# Patient Record
Sex: MALE | Race: BLACK OR AFRICAN AMERICAN
[De-identification: names, ages, dates, MRNs, and addresses within clinical notes are randomized per-mention and may not be internally consistent; named-entity substitution may affect disease eponyms.]

---

## 2023-10-23 ENCOUNTER — HOSPITAL ENCOUNTER (EMERGENCY)
Dept: HOSPITAL 97 - ER | Age: 28
Discharge: HOME | End: 2023-10-23
Payer: SELF-PAY

## 2023-10-23 DIAGNOSIS — R51.9: ICD-10-CM

## 2023-10-23 DIAGNOSIS — Z72.0: ICD-10-CM

## 2023-10-23 DIAGNOSIS — J02.9: ICD-10-CM

## 2023-10-23 DIAGNOSIS — R05.9: Primary | ICD-10-CM

## 2023-10-23 DIAGNOSIS — Z20.822: ICD-10-CM

## 2023-10-23 DIAGNOSIS — J06.9: ICD-10-CM

## 2023-10-23 PROCEDURE — 87635 SARS-COV-2 COVID-19 AMP PRB: CPT

## 2023-10-23 PROCEDURE — 87081 CULTURE SCREEN ONLY: CPT

## 2023-10-23 PROCEDURE — 87804 INFLUENZA ASSAY W/OPTIC: CPT

## 2023-10-23 PROCEDURE — 71045 X-RAY EXAM CHEST 1 VIEW: CPT

## 2023-10-23 NOTE — ER
Nurse's Notes                                                                                     

 [facility]                                                                 

Name: Elaine Levi                                                                             

Age: 28 yrs                                                                                       

Sex: Male                                                                                         

: 1995                                                                                   

MRN: [id number]                                                                                   

Arrival Date: 10/23/2023                                                                          

Time: 16:20                                                                                       

Account#: [id number]                                                                            

Bed 12                                                                                            

Private MD:                                                                                       

Diagnosis: Acute upper respiratory infection, unspecified                                         

                                                                                                  

Presentation:                                                                                     

10/23                                                                                             

16:37 Chief complaint: Patient states: sore throat and headache onset yesterday. Coronavirus  cm10

      screen: Vaccine status: Patient reports receiving the 2nd dose of the covid vaccine.        

      Client denies travel out of the [place] in the last 14 days. Ebola Screen: Patient denies      

      travel to an Ebola-affected area in the 21 days before illness onset. No symptoms or        

      risks identified at this time. Initial Sepsis Screen: Does the patient meet any 2           

      criteria? No. Patient's initial sepsis screen is negative. Does the patient have a          

      suspected source of infection? No. Patient's initial sepsis screen is negative. Risk        

      Assessment: Do you want to hurt yourself or someone else? Patient reports no desire to      

      harm self or others. Onset of symptoms was 2023.                                

16:37 Method Of Arrival: Ambulatory                                                           cm10

16:37 Acuity: CLAUDINE 3                                                                           cm10

                                                                                                  

Triage Assessment:                                                                                

16:38 General: Appears in no apparent distress. comfortable, Behavior is calm, cooperative.   cm10

      Pain: Complains of pain in head. EENT: No deficits noted. Reports pain in throat.           

      Neuro: No deficits noted. Mares Agitation-Sedation Scale (RASS): 0 - Alert and Calm      

      Level of Consciousness is awake, alert, obeys commands, Oriented to person, place,          

      time, situation. Cardiovascular: No deficits noted. Patient's skin is warm and dry.         

      Respiratory: No deficits noted. Airway is patent Respiratory effort is even, unlabored,     

      Respiratory pattern is regular, symmetrical. GI: No deficits noted. Reports nausea. :     

      No deficits noted. No signs and/or symptoms were reported regarding the genitourinary       

      system. Derm: No deficits noted. No signs and/or symptoms reported regarding the            

      dermatologic system. Skin is intact, Skin is pink, warm \T\ dry. Musculoskeletal: No        

      deficits noted. No signs and/or symptoms reported regarding the musculoskeletal system.     

      Range of motion: intact in all extremities.                                                 

                                                                                                  

Historical:                                                                                       

- Allergies:                                                                                      

16:38 No Known Allergies;                                                                     cm10

- Home Meds:                                                                                      

16:38 None [Active];                                                                          cm10

- PMHx:                                                                                           

16:38 None;                                                                                   cm10

- PSHx:                                                                                           

16:38 None;                                                                                   cm10

                                                                                                  

- Immunization history:: Adult Immunizations unknown.                                             

- Social history:: Smoking status: Patient reports the use of cigarette tobacco                   

  products, smokes one-half pack cigarettes per day.                                              

                                                                                                  

                                                                                                  

Screenin:39 [facility] ED Fall Risk Assessment (Adult) History of falling in the last 3 months,       cm10

      including since admission No falls in past 3 months (0 pts) Confusion or Disorientation     

      No (0 pts) Intoxicated or Sedated No (0 pts) Impaired Gait No (0 pts) Mobility Assist       

      Device Used No (0 pt) Altered Elimination No (0 pt) Score/Fall Risk Level 0 - 2 = Low       

      Risk Oriented to surroundings, Maintained a safe environment, Hourly rounding (assess       

      needs \T\ fall precautionary measures) done. Abuse screen: Denies threats or abuse.         

      Denies injuries from another. Nutritional screening: No deficits noted. Tuberculosis        

      screening: No symptoms or risk factors identified.                                          

                                                                                                  

Assessment:                                                                                       

17:02 General: Appears in no apparent distress. Behavior is calm, cooperative. Pain: Pain     hb  

      currently is 5 out of 10 on a pain scale. Neuro: Level of Consciousness is awake,           

      alert, obeys commands, Oriented to person, place, time, situation. Cardiovascular:          

      Patient's skin is warm and dry. Respiratory: Respiratory effort is even, unlabored,         

      Respiratory pattern is regular, symmetrical. GI: Reports nausea. : No signs and/or        

      symptoms were reported regarding the genitourinary system. EENT: Reports sore throat.       

      Derm: Skin is pink, warm \T\ dry. Musculoskeletal: No signs and/or symptoms reported        

      regarding the musculoskeletal system.                                                       

18:00 Reassessment: Patient appears in no apparent distress at this time. Patient and/or      hb  

      family updated on plan of care and expected duration. Pain level reassessed. Patient is     

      alert, oriented x 3, equal unlabored respirations, skin warm/dry/pink.                      

                                                                                                  

Vital Signs:                                                                                      

16:37  / 79; Pulse 94; Resp 18 S; Temp 98.7(TE); Pulse Ox 98% on R/A; Weight 102.06 kg  cm10

      (R); Height 6 ft. 2 in. (R); Pain 7/10;                                                     

16:37 Body Mass Index 28.89 (102.06 kg, 187.96 cm)                                            cm10

16:37 Pain Scale: Adult                                                                       cm10

                                                                                                  

ED Course:                                                                                        

16:24 Patient arrived in ED.                                                                  mg5 

16:25 [facility]                                                          jh7 

16:25 Roscoe Johnson MD is Attending Physician.                                                7 

16:38 Triage completed.                                                                       10

16:38 Arm band placed on Patient placed in an exam room, on a stretcher.                      cm10

16:39 Patient has correct armband on for positive identification. Bed in low position.        cm10

      Provided Education on: ER process and procedures. .                                         

16:43 Azucena Regalado, RN is Primary Nurse.                                                   hb  

17:02 No provider procedures requiring assistance completed. Patient did not have IV access   hb  

      during this emergency room visit.                                                           

17:17 XRAY Chest (1 view) In Process Unspecified.                                             EDMS

                                                                                                  

Administered Medications:                                                                         

16:34 Not Given (Patient Refused): ns 0.9% 1000 ml IV at 1 bolus Per protocol; 1000 mL bolus  [facility] 

16:34 Not Given (Patient Refused): ondansetron 4 mg IVP once; over 2 minutes                  [facility] 

16:54 Drug: Ondansetron PO 4 mg PO once Route: PO;                                            hb  

17:40 Follow up: Response: No adverse reaction                                                hb  

                                                                                                  

                                                                                                  

Medication:                                                                                       

16:39 VIS not applicable for this client.                                                     [facility]

                                                                                                  

Outcome:                                                                                          

17:55 Discharge ordered by MD.                                                                7 

18:07 Discharged to home ambulatory,                                                          hb  

18:07 Condition: stable                                                                           

18:07 Discharge instructions given to patient, Instructed on discharge instructions, follow       

      up and referral plans. medication usage, Demonstrated understanding of instructions,        

      follow-up care, medications, Prescriptions given X 2,                                       

18:07 Patient left the ED.                                                                    hb  

                                                                                                  

Signatures:                                                                                       

Dispatcher [employer]                           EDMS                                                 

Azucena Regalado, RN                     RN                                                      

Genny Liang FNP FNP  Luanne Marin RN                  RN   10                                                 

Vivienne Araujo                             mg5                                                  

                                                                                                  

**************************************************************************************************

## 2023-10-23 NOTE — RAD REPORT
EXAM DESCRIPTION:  [facility] Single View10/23/2023 5:16 pm

 

CLINICAL HISTORY:  cough

 

COMPARISON:  none

 

FINDINGS:   The lungs appear clear of acute infiltrate. The heart is normal size

 

IMPRESSION:  No acute abnormalities displayed

## 2023-10-23 NOTE — EDPHYS
Physician Documentation                                                                           

 [facility]                                                                 

Name: Elaine Levi                                                                             

Age: 28 yrs                                                                                       

Sex: Male                                                                                         

: 1995                                                                                   

MRN: [id number]                                                                                   

Arrival Date: 10/23/2023                                                                          

Time: 16:20                                                                                       

Account#: [id number]                                                                            

Bed 12                                                                                            

Private MD:                                                                                       

ED Physician Roscoe Johnson                                                                         

HPI:                                                                                              

10/23                                                                                             

16:37 This 28 yrs old Black Male presents to ER via Ambulatory with complaints of Sore        jh7 

      Throat, Doesnt Feel Good.                                                                   

16:37 The patient presents with sore throat. The patient describes throat pain as burning.    jh7 

      Onset: The symptoms/episode began/occurred last night. Modifying factors: the symptoms      

      are aggravated by swallowing. Associated signs and symptoms: Pertinent positives:           

      chills, cough, shortness of breath Sore throat.                                             

                                                                                                  

Historical:                                                                                       

- Allergies:                                                                                      

16:38 No Known Allergies;                                                                     cm10

- Home Meds:                                                                                      

16:38 None [Active];                                                                          cm10

- PMHx:                                                                                           

16:38 None;                                                                                   cm10

- PSHx:                                                                                           

16:38 None;                                                                                   cm10

                                                                                                  

- Immunization history:: Adult Immunizations unknown.                                             

- Social history:: Smoking status: Patient reports the use of cigarette tobacco                   

  products, smokes one-half pack cigarettes per day.                                              

                                                                                                  

                                                                                                  

ROS:                                                                                              

16:37 Constitutional: Negative for fever, chills, and weight loss, Eyes: Negative for injury, jh7 

      pain, redness, and discharge, Neck: Negative for injury, pain, and swelling,                

      Cardiovascular: Negative for chest pain, palpitations, and edema, Back: Negative for        

      injury and pain, MS/Extremity: Negative for injury and deformity, Skin: Negative for        

      injury, rash, and discoloration, Neuro: Negative for headache, weakness, numbness,          

      tingling, and seizure,                                                                      

16:37 Constitutional:                                                                             

16:37 ENT: Positive for sore throat,                                                              

16:37 Respiratory: Positive for cough, shortness of breath,                                       

16:37 Abdomen/GI: Positive for nausea, vomiting, Negative for abdominal pain,                     

16:37 All other systems are negative,                                                             

                                                                                                  

Exam:                                                                                             

16:37 Constitutional:  This is a well developed, well nourished patient who is awake, alert,  jh7 

      and in no acute distress. Head/Face:  Normocephalic, atraumatic. Cardiovascular:            

      Regular rate and rhythm with a normal S1 and S2.  No gallops, murmurs, or rubs.  Normal     

      PMI, no JVD.  No pulse deficits. Abdomen/GI:  Soft, non-tender, with normal bowel           

      sounds.  No distension or tympany.  No guarding or rebound.  No evidence of tenderness      

      throughout. Skin:  Warm, dry with normal turgor.  Normal color with no rashes, no           

      lesions, and no evidence of cellulitis. MS/ Extremity:  Pulses equal, no cyanosis.          

      Neurovascular intact.  Full, normal range of motion. Neuro:  Awake and alert, GCS 15,       

      oriented to person, place, time, and situation.   Normal gait.                              

16:37 ENT: Posterior pharynx: pooling of secretions, that are mild,                               

16:37 Respiratory: the patient does not display signs of respiratory distress,  Respirations:     

      normal, Breath sounds: decreased breath sounds, that are mild, are scattered, + upper       

      airway congestion.                                                                          

                                                                                                  

Vital Signs:                                                                                      

16:37  / 79; Pulse 94; Resp 18 S; Temp 98.7(TE); Pulse Ox 98% on R/A; Weight 102.06 kg  cm10

      (R); Height 6 ft. 2 in. (R); Pain 7/10;                                                     

16:37 Body Mass Index 28.89 (102.06 kg, 187.96 cm)                                            cm10

16:37 Pain Scale: Adult                                                                       cm10

                                                                                                  

MDM:                                                                                              

16:25 Patient medically screened.                                                             [facility] 

17:55 Differential diagnosis: Allergic rhinitis, bronchitis, group A strep tonsillitis,       [facility] 

      influenza, pharyngitis, upper respiratory infection, viral syndrome. Data reviewed:         

      vital signs, nurses notes, radiologic studies, plain films. I considered the following      

      discharge prescriptions or medication management in the emergency department                

      Medications were administered in the Emergency Department. See MAR. Counseling: I had a     

      detailed discussion with the patient and/or guardian regarding the historical points,       

      exam findings, and any diagnostic results supporting the discharge/admit diagnosis, to      

      return to the emergency department if symptoms worsen or persist or if there are any        

      questions or concerns that arise at home. Response to treatment: the patient's symptoms     

      have mildly improved after treatment. Special discussion: Advised taking Mucinex at         

      home..                                                                                      

                                                                                                  

10/23                                                                                             

16:30 Order name: Strep                                                                       [facility] 

10/23                                                                                             

16:41 Order name: COVID-19 SARS RT PCR; Complete Time: 17:52                                  cm10

10/23                                                                                             

16:41 Order name: Influenza Screen (a \T\ B); Complete Time: 17:33                              cm10

10/23                                                                                             

16:30 Order name: XRAY Chest (1 view); Complete Time: 17:42                                   [facility] 

                                                                                                  

Administered Medications:                                                                         

16:34 Not Given (Patient Refused): ns 0.9% 1000 ml IV at 1 bolus Per protocol; 1000 mL bolus  [facility] 

16:34 Not Given (Patient Refused): ondansetron 4 mg IVP once; over 2 minutes                  [facility] 

16:54 Drug: Ondansetron PO 4 mg PO once Route: PO;                                            hb  

17:40 Follow up: Response: No adverse reaction                                                hb  

                                                                                                  

                                                                                                  

Disposition:                                                                                      

19:04 Co-signature as Attending Physician, Roscoe Johnson MD I reviewed the patient's care       rn  

      provided by the Advanced Practice Provider and agree with the diagnosis and treatment       

      plan.                                                                                       

                                                                                                  

Disposition Summary:                                                                              

10/23/23 17:55                                                                                    

Discharge Ordered                                                                                 

 Notes:       Location: Home                                                                        
  [facility]

      Problem: new                                                                            [facility] 

      Symptoms: have improved                                                                 [facility] 

      Condition: Stable                                                                       [facility] 

      Diagnosis                                                                                   

        - Acute upper respiratory infection, unspecified                                      [facility] 

      Followup:                                                                               [facility] 

        - With: Private Physician                                                                  

        - When: 2 - 3 days                                                                         

        - Reason: Recheck today's complaints                                                       

      Discharge Instructions:                                                                     

        - Discharge Summary Sheet                                                             [facility] 

        - Upper Respiratory Infection, Adult                                                  [facility] 

        - Viral Respiratory Infection                                                         [facility] 

      Forms:                                                                                      

        - Work release form                                                                   [facility] 

        - Medication Reconciliation Form                                                      [facility] 

        - Thank You Letter                                                                    [facility] 

        - Patient Portal Instructions                                                         [facility] 

        - Leadership Thank You Letter                                                         [facility] 

      Prescriptions:                                                                              

        - ondansetron 4 mg Oral Tablet,disintegrating                                              

            - take 1 tablet ORAL route every 4 to 6 hours As needed; 20 tablet; Refills: 0,   [facility] 

      Product Selection Permitted                                                                 

        - Tessalon Perles 100 mg Oral Capsule                                                      

            - take 1 capsule ORAL route every 8 hours As needed; 15 capsule; Refills: 0,      [facility] 

      Product Selection Permitted                                                                 

Signatures:                                                                                       

Dispatcher [employer]                           Roscoe Chapman MD MD rn Baxter, Heather, RN RN                                                      

Genny Liang, LOUISP                   [place] [address]                                                  

Luanne Stiles RN                  RN   cm10                                                 

                                                                                                  

**************************************************************************************************